# Patient Record
(demographics unavailable — no encounter records)

---

## 2024-12-09 NOTE — HISTORY OF PRESENT ILLNESS
[de-identified] : pink eye [FreeTextEntry6] : Aron has had acold for a few days ad today woke up with redness right eye and discharge no fever or other symptoms

## 2024-12-09 NOTE — DISCUSSION/SUMMARY
[FreeTextEntry1] : This visit was provided via telehealth using real-time 2 w[employer]. The patient Aron Delatorre was located at home [address] [place] [address] at the time of the visit The provider Johnie srinivasan, was located at the medical office [address] [place] [address] at the time of the visit. The patient and his mother and provider participated in the telehealth encounter. Verbal consent for telehealth services was given on December 9,2024,by the patient's mother Mrs Delatorre